# Patient Record
Sex: MALE | Race: WHITE | URBAN - METROPOLITAN AREA
[De-identification: names, ages, dates, MRNs, and addresses within clinical notes are randomized per-mention and may not be internally consistent; named-entity substitution may affect disease eponyms.]

---

## 2022-05-10 ENCOUNTER — EMERGENCY (EMERGENCY)
Facility: HOSPITAL | Age: 32
LOS: 1 days | Discharge: HOME | End: 2022-05-10
Admitting: EMERGENCY MEDICINE
Payer: COMMERCIAL

## 2022-05-10 VITALS
HEART RATE: 108 BPM | WEIGHT: 164.91 LBS | DIASTOLIC BLOOD PRESSURE: 80 MMHG | OXYGEN SATURATION: 97 % | TEMPERATURE: 98 F | SYSTOLIC BLOOD PRESSURE: 127 MMHG | RESPIRATION RATE: 19 BRPM

## 2022-05-10 LAB
S PYO AG SPEC QL IA: NEGATIVE — SIGNIFICANT CHANGE UP
SARS-COV-2 RNA SPEC QL NAA+PROBE: DETECTED

## 2022-05-10 PROCEDURE — 99284 EMERGENCY DEPT VISIT MOD MDM: CPT

## 2022-05-10 NOTE — ED ADULT NURSE NOTE - OBJECTIVE STATEMENT
c/o sore throat x2 days worsens in the morning and gets better during the day. Denies fever, cough, SOB, chest pain, congestion. States he feels like there is mucous stuck in his throat, but unable to cough up phlegm.

## 2022-05-10 NOTE — ED PROVIDER NOTE - CLINICAL SUMMARY MEDICAL DECISION MAKING FREE TEXT BOX
sore throat x2 days   rapid strep neg  will f/u throat culture   will send covid     d/c with return precautions

## 2022-05-10 NOTE — ED PROVIDER NOTE - PATIENT PORTAL LINK FT
You can access the FollowMyHealth Patient Portal offered by [facility] by registering at the following website: [email or web address]. By joining [email or web address]’s FollowMyHealth portal, you will also be able to view your health information using other applications (apps) compatible with our system.

## 2022-05-10 NOTE — ED PROVIDER NOTE - OBJECTIVE STATEMENT
33 y/o male here with sore throat for two days. Denies fever, chills, abdominal pain, change in bowel function, flank pain, rash, HA, dizziness, SOB, CP, palpitations, diaphoresis, cough, and malaise. Patient appears well NAD stable and vibrant

## 2022-05-12 DIAGNOSIS — J02.9 ACUTE PHARYNGITIS, UNSPECIFIED: ICD-10-CM

## 2022-05-12 DIAGNOSIS — U07.1 COVID-19: ICD-10-CM

## 2022-05-12 LAB
CULTURE RESULTS: SIGNIFICANT CHANGE UP
SPECIMEN SOURCE: SIGNIFICANT CHANGE UP

## 2025-01-25 ENCOUNTER — EMERGENCY (EMERGENCY)
Facility: HOSPITAL | Age: 35
LOS: 1 days | Discharge: HOME | End: 2025-01-25
Attending: EMERGENCY MEDICINE | Admitting: EMERGENCY MEDICINE
Payer: COMMERCIAL

## 2025-01-25 VITALS
TEMPERATURE: 99 F | DIASTOLIC BLOOD PRESSURE: 72 MMHG | OXYGEN SATURATION: 97 % | WEIGHT: 160.06 LBS | HEART RATE: 109 BPM | HEIGHT: 68 IN | SYSTOLIC BLOOD PRESSURE: 102 MMHG | RESPIRATION RATE: 18 BRPM

## 2025-01-25 VITALS
OXYGEN SATURATION: 98 % | TEMPERATURE: 99 F | DIASTOLIC BLOOD PRESSURE: 87 MMHG | SYSTOLIC BLOOD PRESSURE: 110 MMHG | HEART RATE: 112 BPM | RESPIRATION RATE: 17 BRPM

## 2025-01-25 PROBLEM — Z78.9 OTHER SPECIFIED HEALTH STATUS: Chronic | Status: ACTIVE | Noted: 2022-05-10

## 2025-01-25 LAB
ALBUMIN SERPL ELPH-MCNC: 3.9 G/DL — SIGNIFICANT CHANGE UP (ref 3.4–5)
ALP SERPL-CCNC: 63 U/L — SIGNIFICANT CHANGE UP (ref 40–120)
ALT FLD-CCNC: 20 U/L — SIGNIFICANT CHANGE UP (ref 12–42)
ANION GAP SERPL CALC-SCNC: 8 MMOL/L — LOW (ref 9–16)
APPEARANCE UR: CLEAR — SIGNIFICANT CHANGE UP
APTT BLD: 36.3 SEC — HIGH (ref 24.5–35.6)
AST SERPL-CCNC: 18 U/L — SIGNIFICANT CHANGE UP (ref 15–37)
BILIRUB SERPL-MCNC: 0.9 MG/DL — SIGNIFICANT CHANGE UP (ref 0.2–1.2)
BILIRUB UR-MCNC: NEGATIVE — SIGNIFICANT CHANGE UP
BUN SERPL-MCNC: 21 MG/DL — SIGNIFICANT CHANGE UP (ref 7–23)
CALCIUM SERPL-MCNC: 9 MG/DL — SIGNIFICANT CHANGE UP (ref 8.5–10.5)
CHLORIDE SERPL-SCNC: 105 MMOL/L — SIGNIFICANT CHANGE UP (ref 96–108)
CO2 SERPL-SCNC: 29 MMOL/L — SIGNIFICANT CHANGE UP (ref 22–31)
COLOR SPEC: SIGNIFICANT CHANGE UP
CREAT SERPL-MCNC: 1.19 MG/DL — SIGNIFICANT CHANGE UP (ref 0.5–1.3)
DIFF PNL FLD: NEGATIVE — SIGNIFICANT CHANGE UP
EGFR: 82 ML/MIN/1.73M2 — SIGNIFICANT CHANGE UP
FLUAV AG NPH QL: DETECTED
FLUBV AG NPH QL: SIGNIFICANT CHANGE UP
GLUCOSE SERPL-MCNC: 114 MG/DL — HIGH (ref 70–99)
GLUCOSE UR QL: NEGATIVE MG/DL — SIGNIFICANT CHANGE UP
HCT VFR BLD CALC: 46.8 % — SIGNIFICANT CHANGE UP (ref 39–50)
HGB BLD-MCNC: 16.4 G/DL — SIGNIFICANT CHANGE UP (ref 13–17)
INR BLD: 1.38 — HIGH (ref 0.85–1.16)
KETONES UR-MCNC: 40 MG/DL
LEUKOCYTE ESTERASE UR-ACNC: NEGATIVE — SIGNIFICANT CHANGE UP
LIDOCAIN IGE QN: 18 U/L — SIGNIFICANT CHANGE UP (ref 16–77)
MCHC RBC-ENTMCNC: 32.3 PG — SIGNIFICANT CHANGE UP (ref 27–34)
MCHC RBC-ENTMCNC: 35 G/DL — SIGNIFICANT CHANGE UP (ref 32–36)
MCV RBC AUTO: 92.3 FL — SIGNIFICANT CHANGE UP (ref 80–100)
NITRITE UR-MCNC: NEGATIVE — SIGNIFICANT CHANGE UP
NRBC # BLD: 0 /100 WBCS — SIGNIFICANT CHANGE UP (ref 0–0)
PH UR: 5.5 — SIGNIFICANT CHANGE UP (ref 5–8)
PLATELET # BLD AUTO: 211 K/UL — SIGNIFICANT CHANGE UP (ref 150–400)
POTASSIUM SERPL-MCNC: 3.8 MMOL/L — SIGNIFICANT CHANGE UP (ref 3.5–5.3)
POTASSIUM SERPL-SCNC: 3.8 MMOL/L — SIGNIFICANT CHANGE UP (ref 3.5–5.3)
PROT SERPL-MCNC: 7.8 G/DL — SIGNIFICANT CHANGE UP (ref 6.4–8.2)
PROT UR-MCNC: 30 MG/DL
PROTHROM AB SERPL-ACNC: 16.1 SEC — HIGH (ref 9.9–13.4)
RBC # BLD: 5.07 M/UL — SIGNIFICANT CHANGE UP (ref 4.2–5.8)
RBC # FLD: 11.1 % — SIGNIFICANT CHANGE UP (ref 10.3–14.5)
RSV RNA NPH QL NAA+NON-PROBE: SIGNIFICANT CHANGE UP
SARS-COV-2 RNA SPEC QL NAA+PROBE: SIGNIFICANT CHANGE UP
SODIUM SERPL-SCNC: 142 MMOL/L — SIGNIFICANT CHANGE UP (ref 132–145)
SP GR SPEC: 1.03 — HIGH (ref 1–1.03)
UROBILINOGEN FLD QL: 1 MG/DL — SIGNIFICANT CHANGE UP (ref 0.2–1)
WBC # BLD: 5.55 K/UL — SIGNIFICANT CHANGE UP (ref 3.8–10.5)
WBC # FLD AUTO: 5.55 K/UL — SIGNIFICANT CHANGE UP (ref 3.8–10.5)

## 2025-01-25 PROCEDURE — 99284 EMERGENCY DEPT VISIT MOD MDM: CPT

## 2025-01-25 RX ORDER — ONDANSETRON 4 MG/1
4 TABLET ORAL ONCE
Refills: 0 | Status: COMPLETED | OUTPATIENT
Start: 2025-01-25 | End: 2025-01-25

## 2025-01-25 RX ORDER — SODIUM CHLORIDE 9 MG/ML
1000 INJECTION, SOLUTION INTRAMUSCULAR; INTRAVENOUS; SUBCUTANEOUS ONCE
Refills: 0 | Status: COMPLETED | OUTPATIENT
Start: 2025-01-25 | End: 2025-01-25

## 2025-01-25 RX ORDER — OSELTAMIVIR 75 MG/1
1 CAPSULE ORAL
Qty: 10 | Refills: 0
Start: 2025-01-25 | End: 2025-01-29

## 2025-01-25 RX ORDER — OSELTAMIVIR 75 MG/1
75 CAPSULE ORAL ONCE
Refills: 0 | Status: COMPLETED | OUTPATIENT
Start: 2025-01-25 | End: 2025-01-25

## 2025-01-25 RX ADMIN — SODIUM CHLORIDE 1000 MILLILITER(S): 9 INJECTION, SOLUTION INTRAMUSCULAR; INTRAVENOUS; SUBCUTANEOUS at 12:54

## 2025-01-25 RX ADMIN — OSELTAMIVIR 75 MILLIGRAM(S): 75 CAPSULE ORAL at 15:40

## 2025-01-25 RX ADMIN — ONDANSETRON 4 MILLIGRAM(S): 4 TABLET ORAL at 12:55

## 2025-01-25 RX ADMIN — SODIUM CHLORIDE 1000 MILLILITER(S): 9 INJECTION, SOLUTION INTRAMUSCULAR; INTRAVENOUS; SUBCUTANEOUS at 12:55

## 2025-01-25 RX ADMIN — SODIUM CHLORIDE 500 MILLILITER(S): 9 INJECTION, SOLUTION INTRAMUSCULAR; INTRAVENOUS; SUBCUTANEOUS at 13:56

## 2025-01-25 NOTE — ED PROVIDER NOTE - PATIENT PORTAL LINK FT
You can access the FollowMyHealth Patient Portal offered by [facility] by registering at the following website: [email or web address]. By joining [email or web address]’s FollowMyHealth portal, you will also be able to view your health information using other applications (apps) compatible with our system.

## 2025-01-25 NOTE — ED PROVIDER NOTE - CLINICAL SUMMARY MEDICAL DECISION MAKING FREE TEXT BOX
Patient is a 34-year-old male complaining of nausea, vomiting, diarrhea  since yesterday.  Patient also states that approximately a week ago he felt like he had a fever and lost his sense of smell.  No abdominal pain.  No rash.  No chest pain.  Patient takes no medications and has no allergies to medications.  States he may have eaten some questionable food yesterday.  CBC, CMP, lipase, UA, flu and COVID swab  ordered.  Zofran 4 mg IV ordered.  IV fluids ordered.  EKG performed and QTc 404 prior to administration of Zofran.

## 2025-01-25 NOTE — ED PROVIDER NOTE - NSFOLLOWUPINSTRUCTIONS_ED_ALL_ED_FT
Gastroenteritis    AMBULATORY CARE:    Gastroenteritis, or stomach flu, is an infection of the stomach and intestines. Causes may include bacteria, parasites, viruses, chemicals, or reactions to medicines.  Digestive Tract    Common symptoms include the following:    Diarrhea or gas    Nausea, vomiting, or poor appetite    Abdominal cramps, pain, or gurgling    Fever    Tiredness or weakness    Headache or muscle aches  Seek care immediately if:    You see blood in your vomit or diarrhea.    You cannot stop vomiting.    You have severe abdominal pain, or your abdomen is swollen.    You have not urinated for 12 hours.    You feel like you are going to faint.  Call your doctor if:    You have a fever that does not go away.    You continue to vomit or have diarrhea, even after treatment.    You see worms in your diarrhea.    Your mouth or eyes are dry. You are not urinating as much or as often.    You have questions or concerns about your condition or care.  Treatment for gastroenteritis may include medicines to slow or stop your diarrhea or vomiting. You may also need medicines to treat an infection caused by bacteria or a parasite.    Manage your symptoms:    Drink liquids as directed. Ask your healthcare provider how much liquid to drink each day, and which liquids are best for you. You may also need to drink an oral rehydration solution (ORS). An ORS has the right amounts of sugar, salt, and minerals in water to replace body fluids.    Eat bland foods. When you feel hungry, begin eating soft, bland foods. Examples are bananas, clear soup, potatoes, and applesauce. Do not have dairy products, alcohol, sugary drinks, or drinks with caffeine until you feel better. Avoid eating high-fat or fast foods.    Eat small, frequent meals throughout the day. Your stomach may tolerate small meals every 2 to 3 hours instead of 3 large meals.    Rest as much as possible. Slowly start to do more each day when you begin to feel better.  Prevent the spread of germs: Gastroenteritis can spread easily. Keep yourself, your family, and your surroundings clean to help prevent the spread of gastroenteritis:    Wash your hands often. Use soap and water. Wash your hands after you use the bathroom, change a child's diapers, or sneeze. Wash your hands before you prepare or eat food.  Handwashing      Clean surfaces and do laundry often. Wash your clothes and towels separately from the rest of the laundry. Clean surfaces in your home with antibacterial  or bleach.    Clean food thoroughly and cook safely. Wash raw vegetables before you cook. Cook meat, fish, and eggs fully. Do not use the same dishes for raw meat as you do for other foods. Refrigerate any leftover food immediately.    Be aware when you camp or travel. Drink only clean water. Do not drink from rivers or lakes unless you purify or boil the water first. When you travel, drink bottled water and do not add ice. Do not eat fruit that has not been peeled. Do not eat raw fish or meat that is not fully cooked. Follow up with your doctor this week.  return at any time if you have any concerns.  Take the Tamiflu twice a day as prescribed.    No work for five (5) days.    Influenza    WHAT YOU NEED TO KNOW:    Influenza (the flu) is a viral infection of the lungs and airways. The virus spreads through droplets in the air when someone with flu coughs or sneezes. You may also get the virus through close contact with someone who has the flu. For example, a person with the virus on his or her hands can spread it by shaking hands with someone. You may spread the flu to others for 1 week or longer after signs or symptoms appear.    WHILE YOU ARE HERE:    Informed consent is a legal document that explains the tests, treatments, or procedures that you may need. Informed consent means you understand what will be done and can make decisions about what you want. You give your permission when you sign the consent form. You can have someone sign this form for you if you are not able to sign it. You have the right to understand your medical care in words you know. Before you sign the consent form, understand the risks and benefits of what will be done. Make sure all your questions are answered.    An IV is a small tube placed in your vein that is used to give you medicine or liquids.    You may need extra oxygen if your blood oxygen level is lower than it should be. You may get oxygen through a mask placed over your nose and mouth or through small tubes placed in your nostrils. Ask your healthcare provider before you take off the mask or oxygen tubing.    Isolation: You will need to wear a mask to help prevent the spread of the flu to other people. People near you should wear a mask, and they may also wear gloves, goggles, and a gown. People who enter your room should wash their hands before they leave.    Medicines:    Acetaminophen decreases pain and fever.    NSAIDs decrease pain and fever.    Bronchodilators may be given to help open your airways so you can breathe more easily.    Antivirals help treat viral infections.  Treatment:    Suction helps remove mucus so you can breathe more easily. A small tube is placed in your mouth or nose.    Nebulizer treatments are medicines that you inhale to help you breathe more easily. The medicines are a mist given through a mouthpiece or mask.    A ventilator is a machine that gives you oxygen and breathes for you when you cannot breathe well on your own. An endotracheal (ET) tube is put into your mouth or nose and attached to the ventilator. You may need a trach if an ET tube cannot be placed. A trach is a tube put through an incision and into your windpipe.  RISKS:    You may develop dehydration. If dehydration becomes severe, it can cause kidney, heart, and brain damage. Asthma, lung disease, and heart disease may get worse when you have the flu. The flu can lead to ear, throat, and sinus infections. If you have a high fever, you may begin to have seizures. You may get lung infections such as pneumonia or bronchitis. You may get an infection in your blood, heart, or brain. The flu can be life-threatening.    Gastroenteritis    AMBULATORY CARE:    Gastroenteritis, or stomach flu, is an infection of the stomach and intestines. Causes may include bacteria, parasites, viruses, chemicals, or reactions to medicines.  Digestive Tract    Common symptoms include the following:    Diarrhea or gas    Nausea, vomiting, or poor appetite    Abdominal cramps, pain, or gurgling    Fever    Tiredness or weakness    Headache or muscle aches  Seek care immediately if:    You see blood in your vomit or diarrhea.    You cannot stop vomiting.    You have severe abdominal pain, or your abdomen is swollen.    You have not urinated for 12 hours.    You feel like you are going to faint.  Call your doctor if:    You have a fever that does not go away.    You continue to vomit or have diarrhea, even after treatment.    You see worms in your diarrhea.    Your mouth or eyes are dry. You are not urinating as much or as often.    You have questions or concerns about your condition or care.  Treatment for gastroenteritis may include medicines to slow or stop your diarrhea or vomiting. You may also need medicines to treat an infection caused by bacteria or a parasite.    Manage your symptoms:    Drink liquids as directed. Ask your healthcare provider how much liquid to drink each day, and which liquids are best for you. You may also need to drink an oral rehydration solution (ORS). An ORS has the right amounts of sugar, salt, and minerals in water to replace body fluids.    Eat bland foods. When you feel hungry, begin eating soft, bland foods. Examples are bananas, clear soup, potatoes, and applesauce. Do not have dairy products, alcohol, sugary drinks, or drinks with caffeine until you feel better. Avoid eating high-fat or fast foods.    Eat small, frequent meals throughout the day. Your stomach may tolerate small meals every 2 to 3 hours instead of 3 large meals.    Rest as much as possible. Slowly start to do more each day when you begin to feel better.  Prevent the spread of germs: Gastroenteritis can spread easily. Keep yourself, your family, and your surroundings clean to help prevent the spread of gastroenteritis:    Wash your hands often. Use soap and water. Wash your hands after you use the bathroom, change a child's diapers, or sneeze. Wash your hands before you prepare or eat food.  Handwashing      Clean surfaces and do laundry often. Wash your clothes and towels separately from the rest of the laundry. Clean surfaces in your home with antibacterial  or bleach.    Clean food thoroughly and cook safely. Wash raw vegetables before you cook. Cook meat, fish, and eggs fully. Do not use the same dishes for raw meat as you do for other foods. Refrigerate any leftover food immediately.    Be aware when you camp or travel. Drink only clean water. Do not drink from rivers or lakes unless you purify or boil the water first. When you travel, drink bottled water and do not add ice. Do not eat fruit that has not been peeled. Do not eat raw fish or meat that is not fully cooked.

## 2025-01-25 NOTE — ED ADULT TRIAGE NOTE - CHIEF COMPLAINT QUOTE
Pt here with NVD, subj fever since yesterday. Suspects food poisoning- ate some questionable food. Denies sick contacts.

## 2025-01-25 NOTE — ED PROVIDER NOTE - OBJECTIVE STATEMENT
Patient is a 34-year-old male complaining of nausea, vomiting, diarrhea  since yesterday.  Patient also states that approximately a week ago he felt like he had a fever and lost his sense of smell.  No abdominal pain.  No rash.  No chest pain.  Patient takes no medications and has no allergies to medications.  States he may have eaten some questionable food yesterday.  CBC, CMP, lipase, UA, flu and COVID swab  ordered.  Zofran 4 mg IV ordered.  IV fluids ordered.

## 2025-01-28 DIAGNOSIS — R11.2 NAUSEA WITH VOMITING, UNSPECIFIED: ICD-10-CM

## 2025-01-28 DIAGNOSIS — J10.1 INFLUENZA DUE TO OTHER IDENTIFIED INFLUENZA VIRUS WITH OTHER RESPIRATORY MANIFESTATIONS: ICD-10-CM

## 2025-01-28 DIAGNOSIS — R19.7 DIARRHEA, UNSPECIFIED: ICD-10-CM
